# Patient Record
(demographics unavailable — no encounter records)

---

## 2025-06-09 NOTE — HISTORY OF PRESENT ILLNESS
[FreeTextEntry1] : 21y F, preferred name Franck, here for evaluation of EDS  - reports increasing pain over last few years, but negative w/u - doctors and PT noted hypermobility. Had to stop PT and taking ibuprofen, self-exercise, stretching, warm baths - note subluxations in shoulders and wrists - has done PT in the past, helpful but not specialized. Had to stop at times due to other committment - takes ibuprofen sparingly w/ little effect, same with APAP - on Adderall 5mg daily and guanfacine ER 1mg daily for ADHD. has been on SSRIs and similar meds in past - takes Propranolol 10mg daily prn anxiety - on testosterone for GAC - prior w/u negative for autoimmune/inflammatory diseases. ?dx of FMS as well  The clinical diagnosis of hypermobile EDS needs the simultaneous presence of all criteria, 1 and 2 and 3. CRITERION 1  Generalized Joint Hypermobility One of the following selected:  [X] [X] Passively dorsiflex the 5th metacarpophalangeal joint by at least 90 degrees [ ] [ ] Oppose the thumb to the volar aspect of the ipsilateral forearm [X] [X]Hyperextend the elbow by at least 10 degrees  [ ] [ ]Hyperextend the knee by at least 10 degrees [ ] Place the hands flat on the floor without bending the knees      Beighton Score: [ ] /9  [ ] =6 pre-pubertal children and adolescents [X] =5 pubertal men and woman to age 50  [ ] =4 men and women over the age of 50  If Beighton Score is one point below age- and sex-specific cut off, two or more of the following must also be selected to meet criterion: [X] Can you now (or could you ever) place your hands flat on the floor without bending your knees? [ ] Can you now (or could you ever) bend your thumb to touch your forearm? [X] As a child, did you amuse your friends by contorting your body into strange shapes or could you do the splits? [ ] As a child or teenager, did your shoulder or kneecap dislocate on more than one occasion? Note shoulder and wrists sublux easily, multiple ankle strains/sprains [ ] Do you consider yourself double jointed?  CRITERION 2  Two or more of the following features (A, B, or C) must be present Feature A (five must be present) Fascia: [X] Unusually soft or velvety skin [X] Mild skin hyperextensibility [ ] Unexplained striae distensae or rubae at the back, groins, thighs, breasts and/or abdomen in adolescents, men or pre-pubertal women  without a history of significant gain or loss of body fat or weight [ ] Bilateral piezogenic papules of the heel [ ] Recurrent or multiple abdominal hernia(s) [ ] Atrophic scarring involving at least two sites and without the formation of truly papyraceous and/or hemosideric scars as seen in classical EDS [ ] Pelvic floor, rectal, and/or uterine prolapse in children, men or nulliparous women without a history of morbid obesity or other known  predisposing medical condition Marfanoid features: [X] Dental crowding and high or narrow palate [ ] Arachnodactyly, as defined in one or more of the following: (i) positive wrist sign (Walker sign) on both sides, (ii) positive thumb sign (Nellie sign) on both sides [ ] Arm iqvm-[id number]-[id number] ratio =1.05 [ ] Mitral valve prolapse (MVP) mild or greater based on strict echocardiographic criteria [ ] Aortic root dilatation with Z-score >+2 Feature A total:[ ] /12  Feature B [ ] Positive family history; one or more first-degree relatives independently meeting the current criteria for hEDS Feature C (must have at least one) [X] Musculoskeletal pain in two or more limbs, recurring daily for at least 3 months [X] Chronic,widespread pain for =3 months [ ] Recurrent joint dislocations or isi joint instability, in the absence of trauma  CRITERION 3 - All of the following prerequisites MUST be met [ ] 1. Absence of unusual skin fragility, which should prompt consideration of other types of EDS [ ] 2. Exclusion of other heritable and acquired connective tissue disorders, including autoimmune rheumatologic conditions. In patients with an  acquired CTD (e.g. Lupus, Rheumatoid Arthritis, etc.), additional diagnosis of hEDS requires meeting both Features A and B of Criterion 2.  Feature C of Criterion 2 (chronic pain and/or instability) cannot be counted toward a diagnosis of hEDS in this situation. [ ] 3. Exclusion of alternative diagnoses that may also include joint hypermobility by means of hypotonia and/or connective tissue laxity. Alternative diagnoses and diagnostic categories include, but are not limited to, neuromuscular disorders (e.g. [place] myopathy), other  hereditary disorders of the connective tissue (e.g. other types of EDS, Loeys-Juancho syndrome, Marfan syndrome), and skeletal dysplasias  (e.g. osteogenesis imperfecta). Exclusion of these considerations may be based upon history, physical examination, and/or molecular  genetic testing, as indicated.  Additional co-morbid features (non-diagnostic criteria) Other disorders often associated with HSD and hEDS include  Gastrointestinal and genitourinary (including gynecologic; 50 percent of symptomatic patients): ....[X] Bowel symptoms suggestive of functional gastrointestinal disorders (constipation alternating with diarrhea, bloating, nausea, and pain) and early satiety (ie, IBS) ........[ ] Bowel dysmotility, especially slow-transit constipation (gastroparesis, slow GI motility disorders).  ....[ ] Heavy and painful menstrual bleeding, ....[ ] esophagitis ....[X] chronic GERD [X] Disabling, persistent fatigue. Psychiatric / Psychological / Social ....[X] Anxiety, depression, and phobia (eg, fear of movement).  ....[X] ADHD/ADD ....[ ] OCD, ....[ ] ASD ....[X] TGD.  Also PTSD dx ....[ ] eating disorders Dysautonomia / POTS: [ ] Autonomic dysfunction (dysautonomia, POTS, etc) ....[X] Palpitations, chest pain, and near-syncope or syncope due to postural tachycardia. Happens 1x/month w/ positional changes, heat, etc. ....[ ] Orthostatic symptoms, including (near) blackouts due to postural hypotension. ....[ ] Skin color change, [X] abnormal sweating (takes testosterone and this worsens the symptoms) MCAS / Histamine Intolerance / Excessive Allergic Responses: ....[ ] Allergy / MCAS symptoms: severe allergies, sensitivities including rashes, anaphylaxis, angioedema, urticaria, or atopy affecting GI, lungs, skin; elevated tryptase levels (> 1.2 x baseline + 2 ng/mL). [ ] urticaria. [ ] Eczema, [ ] asthma, [ ] angioedema, [ ] anaphylaxis PCN allergies- urticaria No food allergies Seasonal allergies - mild  Neurological symptoms: ....[ ] paresthesia in extremities ....[X] muscle weakness, noted worsen after being sedentary x 1 year ....[ ] compressive neuropathies (ie. CTS, radiculopathy)  ....[X ] chronic HA/migraines, previous history but improved. tension headaches typically ....[ ] head pressure pain, heavy head, ....[X] tremor, seizure disorders, movement disorders, tics ....[X] reduced proprioception, Skin issues: ....[X] Easy bruising ....[X] Delayed wound healing ....[ ] Skin tears Other MSK: ....[X] jaw pain, locking, TMJ ....[X] Frequent sprains, ankle rolls, injuries ....[ ] Meniscal tear, RTC tear, labrum tear, tendon ruptures  Marfan's Systemic Criteria [ ] Wrist (Walker) and Thumb (Nellie) Sign: Positive if both signs are present (3 points). [ ] Wrist (Walker) OR Thumb (Nellie) Sign (1 point) [ ] Pectus Carinatum Deformity: 2 points. [ ] Pectus Excavatum or Chest Asymmetry: 1 point. [ ] Reduced Upper Segment to Lower Segment Ratio AND increased Arm Span to Height Ratio: 1 point. [X] Scoliosis OR Thoracolumbar Kyphosis: 1 point. [ ] Reduced Elbow Extension: 1 point [ ] Medial Displacement of the Medial Malleolus causing Pes Planus (Flat Feet): 1 point. [ ] Hindfoot Deformity (valgus in combination with forefoot abduction and lowering of the midfoot) 2 points [ ] Spontaneous PTX [ ] Protrusio Acetabuli: 2 points. [ ] Dural Ectasia: 2 points. [ ] Myopia greater than 3 diopters: 1 point. [ ] 3 of 5 Facial features: 3/5 typical facial characteristics including [ ] dolichocephaly, [ ] downward slanting palpebral fissures, [ ] enophthalmos, [ ] retrognathia and [ ] malar hypoplasia. 1 pint [ ] Skin Striae. 1 point. [ ] Mitral Valve Prolapse: 1 point. [ ] Aortic Root Dilation with Z-score >= 2 (major feature, not part of scoring for points)  All other ROS negative except as mentioned in HPI.

## 2025-06-09 NOTE — ASSESSMENT
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  Pt required specialized PT for strengthening, myofascial pain/myalgia release and massage, and joint preservation techniques. Chronic pain not controlled w/ NSAIDs and history of SSRI med use in the past, prefer to use LDN to manage chronic pain, pain hypersensitization, and anti-inflammatory effects.  The limitations of genetic testing was discussed with the patient. Testing may result in negative findings, variants of unknown clinical signfiicance, or pathogenic variants in genes tested. Hereditary Disorders of Connective Tissue genetic panel reviews over 60 connective tissue genes for molecular confirmation of a clinical diagnosis in symptomatic individuals and it may help in the risk assessment of asymptomatic family members of a proband. The panel is commonly used to evaluate for genetic connective tissue diseases such as Luann-Danlos, Marfan's Syndrome, Loeys-Juancho syndrome, Cutis Laxa, Stickler Syndrome, and many other diseases commonly associated with hypermobility. The clinical sensitivity of the HDCT Panel depends in part on the patient's clinical and family history. It is estimated that this panel would detect a pathogenic variant in approximately 20% of individuals with familial TAAD (Gregory et al., 2017), 72-93% of individuals fulfilling [place] criteria for Marfan syndrome (Merline et al., 2009; Dima et al., 2005), over 75% of individuals with a clinical diagnosis of Stickler syndrome (Norma et al., 1999), up to 87% of individuals with a clinical diagnosis of Loeys-Juancho syndrome (Merline et al., 2008), over 90% of persons with classic Luann-Danlos syndrome (Julio C et al., 2012) and approximately 95% of individuals with vascular Luann-Danlos syndrome (Charles et al., 2015).   - HDCT genetic testing drawn in office today - Hypermobile PT rx provided and list of local providers recommended given to patient..  PT at least 1h weekly x 12+ weeks, on average 6-9 months at least warranted - chronic pain management-  start LDN 2mg - take 1/2 tab nightly x 2 weeks then 1 tab therafter nightly. Consider optimal range 2-3 mg nightly, may increase/decrease according to response - previous rheum w/u reportedly negative - TTE done in past negative in setting of anxiety. Takes propranolol 10mg PRN anxiety/palpitations - c/w iburprofen 200-400mg PRN pain q8h, APAP 650-1000mg q8h prn mild pain  RTO 3-4 mos RGC

## 2025-06-09 NOTE — PHYSICAL EXAM
[Total Score ___] : Total Score = [unfilled] [Mild] : mild [No] : No [>= 5 pubertal men and women to age 50] : >= 5 pubertal men and women to age 50 [Beighton Score: ___/ 9] : Beighton Score: [unfilled]/ 9 [Can you now (or could you ever) place your hands flat on the floor without bending your knees?] : Patient can (or previously) place hands flat on the floor without bending their knees [As a child, did you amuse  your friends by contorting your body into strange shapes or could you do the splits?] : Patient, as a child, was able to contort body or perform splits [Unusually soft or velvety skin] : Unusually soft or velvety skin [Bilateral piezogenic papules of the heel] : Bilateral piezogenic papules of the heel [Mild skin hyperextensibility] : Mild skin hyperextensibility [Dental crowding and high or narrow palate] : Dental crowding and high or narrow palate [Feature A Total: ___/ 12] : Feature A Total: [unfilled]/ 12 [Arachnodactyly, as defined in one or more of the following: (i) positive wrist sign (Nellie sign) on both sides,] : Arachnodactyly, as defined in one or more of the following: (i) positive wrist sign (Nellie sign) on both sides, (ii) positive thumb sign (Walker sign) on both sides [Musculoskeletal pain in two or more limbs, recurring daily for at least 3 months] : Musculoskeletal pain in two or more limbs, recurring daily for at least 3 months [Chronic, widespread pain for 3 months] : Chronic, widespread pain for 3 months [Absence of unusual skin fragility, which should prompt consideration of other types of EDS] : 1. Absence of unusual skin fragility, which should prompt consideration of other types of EDS [Exclusion of other heritable and acquired connective tissue disorders, including autoimmune rheumatologic] : 2. Exclusion of other heritable and acquired connective tissue disorders, including autoimmune rheumatologic conditions. In patients with an acquired CTD (e.g. Lupus, Rheumatoid Arthritis, etc.), additional diagnosis of hEDS requires meeting both Features A and B of Criterion 2. Feature C of Criterion 2 (chronic pain and/or instability) cannot be counted toward a diagnosis of hEDS in this situation [Exclusion of alternative diagnoses that may also include joint hypermobility by means of hypotonia and/or] : 3. Exclusion of alternative diagnoses that may also include joint hypermobility by means of hypotonia and/or connective tissue laxity. Alternative diagnoses and diagnostic categories include, but are not limited to, neuromuscular disorders (e.g. [place] myopathy), other hereditary disorders of the connective tissue (e.g. other types of EDS, Loeys-Juancho syndrome, Marfan syndrome), and skeletal dysplasias (e.g. osteogenesis imperfecta). Exclusion of these considerations may be based upon history, physical examination, and/or molecular genetic testing, as indicated. [Right] : Right: N [Left] : Left: N [Bilateral] : bilateral negative [] : No [FreeTextEntry5] : 162 [FreeTextEntry4] : 160 [FreeTextEntry8] : 83 [de-identified] : 0.98